# Patient Record
Sex: MALE
[De-identification: names, ages, dates, MRNs, and addresses within clinical notes are randomized per-mention and may not be internally consistent; named-entity substitution may affect disease eponyms.]

---

## 2021-10-14 ENCOUNTER — RX ONLY (RX ONLY)
Age: 61
End: 2021-10-14

## 2021-10-14 RX ORDER — METRONIDAZOLE 7.5 MG/ML
LOTION TOPICAL
Qty: 177 | Refills: 3 | Status: ERX | COMMUNITY
Start: 2021-10-14

## 2021-11-16 ENCOUNTER — APPOINTMENT (OUTPATIENT)
Dept: URBAN - METROPOLITAN AREA CLINIC 269 | Age: 61
Setting detail: DERMATOLOGY
End: 2021-11-17

## 2021-11-16 DIAGNOSIS — D22 MELANOCYTIC NEVI: ICD-10-CM

## 2021-11-16 DIAGNOSIS — L57.0 ACTINIC KERATOSIS: ICD-10-CM

## 2021-11-16 DIAGNOSIS — L81.4 OTHER MELANIN HYPERPIGMENTATION: ICD-10-CM

## 2021-11-16 DIAGNOSIS — D18.0 HEMANGIOMA: ICD-10-CM

## 2021-11-16 DIAGNOSIS — L82.1 OTHER SEBORRHEIC KERATOSIS: ICD-10-CM

## 2021-11-16 DIAGNOSIS — L71.8 OTHER ROSACEA: ICD-10-CM

## 2021-11-16 PROBLEM — D22.61 MELANOCYTIC NEVI OF RIGHT UPPER LIMB, INCLUDING SHOULDER: Status: ACTIVE | Noted: 2021-11-16

## 2021-11-16 PROBLEM — D18.01 HEMANGIOMA OF SKIN AND SUBCUTANEOUS TISSUE: Status: ACTIVE | Noted: 2021-11-16

## 2021-11-16 PROBLEM — D22.62 MELANOCYTIC NEVI OF LEFT UPPER LIMB, INCLUDING SHOULDER: Status: ACTIVE | Noted: 2021-11-16

## 2021-11-16 PROBLEM — D22.39 MELANOCYTIC NEVI OF OTHER PARTS OF FACE: Status: ACTIVE | Noted: 2021-11-16

## 2021-11-16 PROBLEM — D22.5 MELANOCYTIC NEVI OF TRUNK: Status: ACTIVE | Noted: 2021-11-16

## 2021-11-16 PROCEDURE — OTHER PRESCRIPTION: OTHER

## 2021-11-16 PROCEDURE — OTHER REASSURANCE: OTHER

## 2021-11-16 PROCEDURE — OTHER LIQUID NITROGEN: OTHER

## 2021-11-16 PROCEDURE — 99213 OFFICE O/P EST LOW 20 MIN: CPT | Mod: 25

## 2021-11-16 PROCEDURE — OTHER COUNSELING: OTHER

## 2021-11-16 PROCEDURE — 17000 DESTRUCT PREMALG LESION: CPT

## 2021-11-16 PROCEDURE — 17003 DESTRUCT PREMALG LES 2-14: CPT

## 2021-11-16 PROCEDURE — OTHER PRESCRIPTION MEDICATION MANAGEMENT: OTHER

## 2021-11-16 RX ORDER — METRONIDAZOLE 7.5 MG/ML
LOTION TOPICAL
Qty: 59 | Refills: 0 | Status: ERX | COMMUNITY
Start: 2021-11-16

## 2021-11-16 ASSESSMENT — LOCATION DETAILED DESCRIPTION DERM
LOCATION DETAILED: RIGHT MEDIAL ANTECUBITAL SKIN
LOCATION DETAILED: RIGHT CENTRAL MALAR CHEEK
LOCATION DETAILED: LEFT CENTRAL FRONTAL SCALP
LOCATION DETAILED: LEFT ANTERIOR LATERAL DISTAL UPPER ARM
LOCATION DETAILED: RIGHT ANTERIOR PROXIMAL UPPER ARM
LOCATION DETAILED: RIGHT SUPERIOR PARIETAL SCALP
LOCATION DETAILED: RIGHT ANTERIOR DISTAL UPPER ARM
LOCATION DETAILED: LEFT INFERIOR CENTRAL MALAR CHEEK
LOCATION DETAILED: LEFT ANTERIOR DISTAL UPPER ARM
LOCATION DETAILED: PERIUMBILICAL SKIN
LOCATION DETAILED: LEFT SUPERIOR PARIETAL SCALP
LOCATION DETAILED: RIGHT SUPERIOR MEDIAL BUCCAL CHEEK
LOCATION DETAILED: RIGHT LATERAL SUPERIOR CHEST
LOCATION DETAILED: LEFT VENTRAL PROXIMAL FOREARM
LOCATION DETAILED: RIGHT MEDIAL SUPERIOR CHEST
LOCATION DETAILED: RIGHT CENTRAL FRONTAL SCALP
LOCATION DETAILED: RIGHT INFERIOR CENTRAL MALAR CHEEK
LOCATION DETAILED: INFERIOR MID FOREHEAD

## 2021-11-16 ASSESSMENT — LOCATION SIMPLE DESCRIPTION DERM
LOCATION SIMPLE: LEFT CHEEK
LOCATION SIMPLE: RIGHT CHEEK
LOCATION SIMPLE: RIGHT UPPER ARM
LOCATION SIMPLE: INFERIOR FOREHEAD
LOCATION SIMPLE: ABDOMEN
LOCATION SIMPLE: RIGHT ARM
LOCATION SIMPLE: LEFT UPPER ARM
LOCATION SIMPLE: CHEST
LOCATION SIMPLE: LEFT SCALP
LOCATION SIMPLE: RIGHT SCALP
LOCATION SIMPLE: SCALP
LOCATION SIMPLE: LEFT FOREARM

## 2021-11-16 ASSESSMENT — LOCATION ZONE DERM
LOCATION ZONE: ARM
LOCATION ZONE: FACE
LOCATION ZONE: SCALP
LOCATION ZONE: TRUNK

## 2021-11-16 NOTE — PROCEDURE: LIQUID NITROGEN
Consent: The patient's consent was obtained including but not limited to risks of crusting, scabbing, blistering, scarring, darker or lighter pigmentary change, recurrence, incomplete removal and infection.
Show Applicator Variable?: Yes
Duration Of Freeze Thaw-Cycle (Seconds): 3
Detail Level: Simple
Render Post-Care Instructions In Note?: no
Number Of Freeze-Thaw Cycles: 3 freeze-thaw cycles
Post-Care Instructions: I reviewed with the patient in detail post-care instructions. Patient is to wear sunprotection, and avoid picking at any of the treated lesions. Pt may apply Vaseline to crusted or scabbing areas.
Application Tool (Optional): Liquid Nitrogen Sprayer

## 2021-11-16 NOTE — PROCEDURE: PRESCRIPTION MEDICATION MANAGEMENT
Render In Strict Bullet Format?: No
Detail Level: Generalized
Continue Regimen: metronidazole 0.75 % lotion \\nQuantity: 59.0 ml  Days Supply: 30\\nSig: Bid

## 2025-07-29 ENCOUNTER — APPOINTMENT (OUTPATIENT)
Dept: URBAN - METROPOLITAN AREA CLINIC 300 | Age: 65
Setting detail: DERMATOLOGY
End: 2025-07-30

## 2025-07-29 VITALS — WEIGHT: 195 LBS | HEIGHT: 68 IN

## 2025-07-29 DIAGNOSIS — D18.0 HEMANGIOMA: ICD-10-CM

## 2025-07-29 DIAGNOSIS — L81.4 OTHER MELANIN HYPERPIGMENTATION: ICD-10-CM

## 2025-07-29 DIAGNOSIS — D22 MELANOCYTIC NEVI: ICD-10-CM

## 2025-07-29 DIAGNOSIS — L82.1 OTHER SEBORRHEIC KERATOSIS: ICD-10-CM

## 2025-07-29 DIAGNOSIS — L57.8 OTHER SKIN CHANGES DUE TO CHRONIC EXPOSURE TO NONIONIZING RADIATION: ICD-10-CM

## 2025-07-29 DIAGNOSIS — Z71.89 OTHER SPECIFIED COUNSELING: ICD-10-CM

## 2025-07-29 PROBLEM — D22.9 MELANOCYTIC NEVI, UNSPECIFIED: Status: ACTIVE | Noted: 2025-07-29

## 2025-07-29 PROBLEM — D18.01 HEMANGIOMA OF SKIN AND SUBCUTANEOUS TISSUE: Status: ACTIVE | Noted: 2025-07-29

## 2025-07-29 PROCEDURE — OTHER MIPS QUALITY: OTHER

## 2025-07-29 PROCEDURE — 99203 OFFICE O/P NEW LOW 30 MIN: CPT

## 2025-07-29 PROCEDURE — OTHER ADDITIONAL NOTES: OTHER

## 2025-07-29 PROCEDURE — OTHER COUNSELING: OTHER

## 2025-07-29 PROCEDURE — OTHER REASSURANCE: OTHER

## 2025-07-29 ASSESSMENT — LOCATION ZONE DERM
LOCATION ZONE: SCALP
LOCATION ZONE: ARM

## 2025-07-29 ASSESSMENT — LOCATION SIMPLE DESCRIPTION DERM
LOCATION SIMPLE: LEFT FOREARM
LOCATION SIMPLE: LEFT SCALP
LOCATION SIMPLE: RIGHT UPPER ARM
LOCATION SIMPLE: LEFT UPPER ARM
LOCATION SIMPLE: RIGHT SCALP

## 2025-07-29 ASSESSMENT — LOCATION DETAILED DESCRIPTION DERM
LOCATION DETAILED: RIGHT PROXIMAL POSTERIOR UPPER ARM
LOCATION DETAILED: LEFT MEDIAL FRONTAL SCALP
LOCATION DETAILED: RIGHT MEDIAL FRONTAL SCALP
LOCATION DETAILED: LEFT PROXIMAL DORSAL FOREARM
LOCATION DETAILED: RIGHT CENTRAL FRONTAL SCALP
LOCATION DETAILED: LEFT DISTAL POSTERIOR UPPER ARM